# Patient Record
Sex: FEMALE | Race: BLACK OR AFRICAN AMERICAN | NOT HISPANIC OR LATINO
[De-identification: names, ages, dates, MRNs, and addresses within clinical notes are randomized per-mention and may not be internally consistent; named-entity substitution may affect disease eponyms.]

---

## 2017-02-08 ENCOUNTER — OTHER (OUTPATIENT)
Age: 47
End: 2017-02-08

## 2017-02-09 ENCOUNTER — APPOINTMENT (OUTPATIENT)
Dept: ORTHOPEDIC SURGERY | Facility: CLINIC | Age: 47
End: 2017-02-09

## 2017-02-10 ENCOUNTER — OTHER (OUTPATIENT)
Age: 47
End: 2017-02-10

## 2017-02-13 ENCOUNTER — OTHER (OUTPATIENT)
Age: 47
End: 2017-02-13

## 2017-02-14 ENCOUNTER — APPOINTMENT (OUTPATIENT)
Dept: ORTHOPEDIC SURGERY | Facility: CLINIC | Age: 47
End: 2017-02-14

## 2017-02-14 VITALS
HEIGHT: 66 IN | WEIGHT: 184 LBS | TEMPERATURE: 97.9 F | BODY MASS INDEX: 29.57 KG/M2 | DIASTOLIC BLOOD PRESSURE: 82 MMHG | HEART RATE: 77 BPM | SYSTOLIC BLOOD PRESSURE: 134 MMHG

## 2017-02-17 ENCOUNTER — OTHER (OUTPATIENT)
Age: 47
End: 2017-02-17

## 2017-03-03 ENCOUNTER — RESULT REVIEW (OUTPATIENT)
Age: 47
End: 2017-03-03

## 2017-03-08 ENCOUNTER — OTHER (OUTPATIENT)
Age: 47
End: 2017-03-08

## 2017-03-23 ENCOUNTER — OTHER (OUTPATIENT)
Age: 47
End: 2017-03-23

## 2017-04-13 ENCOUNTER — APPOINTMENT (OUTPATIENT)
Dept: ORTHOPEDIC SURGERY | Facility: CLINIC | Age: 47
End: 2017-04-13

## 2017-04-13 VITALS
HEART RATE: 79 BPM | HEIGHT: 66 IN | DIASTOLIC BLOOD PRESSURE: 93 MMHG | BODY MASS INDEX: 29.57 KG/M2 | SYSTOLIC BLOOD PRESSURE: 143 MMHG | WEIGHT: 184 LBS

## 2017-06-23 ENCOUNTER — OTHER (OUTPATIENT)
Age: 47
End: 2017-06-23

## 2018-06-21 ENCOUNTER — OTHER (OUTPATIENT)
Age: 48
End: 2018-06-21

## 2018-06-26 ENCOUNTER — APPOINTMENT (OUTPATIENT)
Dept: ORTHOPEDIC SURGERY | Facility: CLINIC | Age: 48
End: 2018-06-26
Payer: COMMERCIAL

## 2018-06-26 ENCOUNTER — OTHER (OUTPATIENT)
Age: 48
End: 2018-06-26

## 2018-06-26 VITALS
HEART RATE: 71 BPM | WEIGHT: 220 LBS | SYSTOLIC BLOOD PRESSURE: 112 MMHG | DIASTOLIC BLOOD PRESSURE: 74 MMHG | HEIGHT: 66 IN | BODY MASS INDEX: 35.36 KG/M2

## 2018-06-26 PROCEDURE — 73521 X-RAY EXAM HIPS BI 2 VIEWS: CPT | Mod: RT

## 2018-06-26 PROCEDURE — 99213 OFFICE O/P EST LOW 20 MIN: CPT

## 2018-06-26 RX ORDER — CLONAZEPAM 0.25 MG/1
0.25 TABLET, ORALLY DISINTEGRATING ORAL
Qty: 60 | Refills: 0 | Status: ACTIVE | COMMUNITY
Start: 2017-04-06

## 2018-06-26 RX ORDER — ESCITALOPRAM OXALATE 10 MG/1
10 TABLET, FILM COATED ORAL
Refills: 0 | Status: ACTIVE | COMMUNITY

## 2018-06-26 RX ORDER — ENALAPRIL MALEATE 10 MG/1
10 TABLET ORAL
Refills: 0 | Status: ACTIVE | COMMUNITY

## 2018-06-26 RX ORDER — DIVALPROEX SODIUM 500 1/1
500 TABLET, EXTENDED RELEASE ORAL
Qty: 90 | Refills: 0 | Status: ACTIVE | COMMUNITY
Start: 2017-02-22

## 2018-06-26 RX ORDER — LEVETIRACETAM 500 MG/1
500 TABLET, FILM COATED ORAL
Qty: 60 | Refills: 0 | Status: ACTIVE | COMMUNITY
Start: 2017-02-14

## 2018-06-26 RX ORDER — HYALURONATE SODIUM 20 MG/2 ML
20 SYRINGE (ML) INTRAARTICULAR
Qty: 12 | Refills: 0 | Status: DISCONTINUED | OUTPATIENT
Start: 2017-02-14 | End: 2018-06-26

## 2019-04-26 ENCOUNTER — APPOINTMENT (OUTPATIENT)
Dept: ELECTROPHYSIOLOGY | Facility: CLINIC | Age: 49
End: 2019-04-26

## 2019-07-10 ENCOUNTER — RESULT REVIEW (OUTPATIENT)
Age: 49
End: 2019-07-10

## 2020-10-07 ENCOUNTER — RESULT REVIEW (OUTPATIENT)
Age: 50
End: 2020-10-07

## 2020-11-10 ENCOUNTER — APPOINTMENT (OUTPATIENT)
Dept: ORTHOPEDIC SURGERY | Facility: CLINIC | Age: 50
End: 2020-11-10
Payer: MEDICARE

## 2020-11-10 VITALS
WEIGHT: 220 LBS | SYSTOLIC BLOOD PRESSURE: 132 MMHG | DIASTOLIC BLOOD PRESSURE: 91 MMHG | HEART RATE: 77 BPM | BODY MASS INDEX: 35.36 KG/M2 | HEIGHT: 66 IN

## 2020-11-10 DIAGNOSIS — M17.12 UNILATERAL PRIMARY OSTEOARTHRITIS, LEFT KNEE: ICD-10-CM

## 2020-11-10 PROCEDURE — 99214 OFFICE O/P EST MOD 30 MIN: CPT | Mod: 25

## 2020-11-10 PROCEDURE — 73521 X-RAY EXAM HIPS BI 2 VIEWS: CPT

## 2020-11-10 PROCEDURE — 20611 DRAIN/INJ JOINT/BURSA W/US: CPT | Mod: 50

## 2020-11-10 PROCEDURE — 73564 X-RAY EXAM KNEE 4 OR MORE: CPT | Mod: 50

## 2020-11-10 PROCEDURE — 99072 ADDL SUPL MATRL&STAF TM PHE: CPT

## 2020-11-10 NOTE — ADDENDUM
[FreeTextEntry1] : This note was authored by Yeyo Omer working as a medical scribe for Dr. Erick Valdivia. The note was reviewed, edited, and revised by Dr. Erick Valdivia whom is in agreement with the exam findings, imaging findings, and treatment plan. 11/10/2020.

## 2020-11-10 NOTE — PHYSICAL EXAM
[de-identified] : The patient appears well nourished  and in no apparent distress.  The patient is alert and oriented to person, place, and time.   Affect and mood appear normal. The head is normocephalic and atraumatic.  The eyes reveal normal sclera and extra ocular muscles are intact. The tongue is midline with no apparent lesions.  Skin shows normal turgor with no evidence of eczema or psoriasis.  No respiratory distress noted.  Sensation grossly intact.\par   [de-identified] : Exam of the left hip shows hip external rotation of 50 degrees, internal rotation of 25 degrees, hip flexion of 90 degrees.\par Exam of the left knee shows pain with knee ROM, patellofemoral crepitus, 0-115 degrees of flexion. \par Exam of the right knee shows pain with knee ROM, patellofemoral crepitus, 0-115 degrees of flexion. 5/5 motor strength bilaterally distally. Sensation intact distally.  [de-identified] : Xray- 4 views of the bilateral knees shows severe patellofemoral arthritis of the right knee, mild patellofemoral compartment arthritis of the left knee, and moderate medial compartment arthritis of both knees.\par \par X-ray of the pelvis and 2 views both hips shows hardware in the right hip which remains stable. The joint space remains overall fairly well preserved. Left hip replacement components appear to remain well fixed. \par

## 2020-11-10 NOTE — DISCUSSION/SUMMARY
[de-identified] : The patient is a 50 year old female with severe patellofemoral arthritis of the right knee, mild patellofemoral compartment arthritis of the left knee, and moderate medial compartment arthritis of both knees.  She also has some symptoms of trochanteric bursitis on the left side and lumbar radiculopathy.  Conservative options were discussed. She was given a cortisone injection in both knees today under ultrasound-guidance. She was also given a prescription for physical therapy.

## 2020-11-10 NOTE — PROCEDURE
[de-identified] : Using sterile technique, 2cc of depomedrol 40mg/ml, 4cc of 1% plain lidocaine, and 2 cc 0.25% marcaine was drawn up into a sterile syringe. The right knee joint space was identified using ultrasound. The right knee was then sterilely prepped with chlorhexidine. Ethyl chloride spray was used to anesthetize the skin and subQ tissue. The depomedrol/lidocaine/marcaine mixture was then injected into the knee joint in the superolateral position under ultrasound guidance and the needle position in the joint space was confirmed. The patient tolerated the procedure well without difficulty. The patient was given instructions on the use of ice and anti-inflammatories post injection site soreness. \par \par Using sterile technique, 2cc of depomedrol 40mg/ml, 4cc of 1% plain lidocaine, and 2 cc 0.25% marcaine was drawn up into a sterile syringe. The left knee joint space was identified using ultrasound. The left knee was then sterilely prepped with chlorhexidine. Ethyl chloride spray was used to anesthetize the skin and subQ tissue. The depomedrol/lidocaine/marcaine mixture was then injected into the knee joint in the superolateral position under ultrasound guidance and the needle position in the joint was confirmed. The patient tolerated the procedure well without difficulty. The patient was given instructions on the use of ice and anti-inflammatories post injection site soreness. \par

## 2020-11-10 NOTE — REASON FOR VISIT
[Follow-Up Visit] : a follow-up visit for [Other: ____] : [unfilled] [FreeTextEntry2] : S/P Conversion Left THR Removal of hardware left hip DOS:09/28/16.

## 2020-11-10 NOTE — HISTORY OF PRESENT ILLNESS
[de-identified] : Patient is a 50-year-old female status post left total hip replacement 9/28/2016 presenting for follow-up evaluation of left hip pain as well as bilateral knee pain.  Patient has history of seizures and short-term memory loss so is not a very good historian.  She states that back in January she took a fall on the steps landing on her back and her side which she believes precipitated all this pain.  Her hip pain is generalized laterally and posteriorly, but she does admit to intermittent groin pain at times.  She denies any incisional issues.  Patient denies any fevers or chills.  As for her knees she localizes the pain medially and anteriorly.  She states that overall her left knee is worse than the right.  Patient states she has had injections in the past but is not sure what type of injections and believes last one was in 2018.  In addition to this she has tried some therapy and home exercise without significant relief.  Patient takes anti-inflammatories and Tylenol as needed for pain with little improvement.

## 2020-11-17 RX ORDER — TRAMADOL HYDROCHLORIDE 50 MG/1
50 TABLET, COATED ORAL
Qty: 21 | Refills: 0 | Status: ACTIVE | COMMUNITY
Start: 2020-11-17 | End: 1900-01-01

## 2020-12-14 ENCOUNTER — APPOINTMENT (OUTPATIENT)
Dept: OBGYN | Facility: CLINIC | Age: 50
End: 2020-12-14
Payer: MEDICARE

## 2020-12-14 VITALS
BODY MASS INDEX: 37.12 KG/M2 | DIASTOLIC BLOOD PRESSURE: 95 MMHG | SYSTOLIC BLOOD PRESSURE: 151 MMHG | WEIGHT: 231 LBS | HEIGHT: 66 IN

## 2020-12-14 DIAGNOSIS — R87.810 CERVICAL HIGH RISK HUMAN PAPILLOMAVIRUS (HPV) DNA TEST POSITIVE: ICD-10-CM

## 2020-12-14 DIAGNOSIS — Z87.898 PERSONAL HISTORY OF OTHER SPECIFIED CONDITIONS: ICD-10-CM

## 2020-12-14 PROCEDURE — 99202 OFFICE O/P NEW SF 15 MIN: CPT

## 2020-12-14 PROCEDURE — 99072 ADDL SUPL MATRL&STAF TM PHE: CPT

## 2020-12-14 NOTE — HISTORY OF PRESENT ILLNESS
[Patient reported mammogram was normal] : Patient reported mammogram was normal [FreeTextEntry1] : 49 yo P 1021 LMP 02/2020  presents for consultation for colposcopy. 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 She reports was told needed colposcopy however was referred to office that was too far.\par She has no complaints. \par She denies any prior hx of abnormal pap. [TextBox_4] : Hx of Trichomonas  [Mammogramdate] : 03/2020 [PapSmeardate] : 08/2020

## 2020-12-14 NOTE — PHYSICAL EXAM
[Appropriately responsive] : appropriately responsive [Alert] : alert [No Acute Distress] : no acute distress [Soft] : soft [Non-tender] : non-tender [Non-distended] : non-distended [No HSM] : No HSM [No Lesions] : no lesions [No Mass] : no mass [Oriented x3] : oriented x3

## 2020-12-29 ENCOUNTER — APPOINTMENT (OUTPATIENT)
Dept: OBGYN | Facility: CLINIC | Age: 50
End: 2020-12-29

## 2021-01-28 ENCOUNTER — APPOINTMENT (OUTPATIENT)
Dept: OBGYN | Facility: CLINIC | Age: 51
End: 2021-01-28

## 2021-02-10 ENCOUNTER — RESULT REVIEW (OUTPATIENT)
Age: 51
End: 2021-02-10

## 2021-03-04 ENCOUNTER — APPOINTMENT (OUTPATIENT)
Dept: OBGYN | Facility: CLINIC | Age: 51
End: 2021-03-04

## 2021-04-07 ENCOUNTER — RESULT REVIEW (OUTPATIENT)
Age: 51
End: 2021-04-07

## 2021-07-20 ENCOUNTER — APPOINTMENT (OUTPATIENT)
Dept: UROLOGY | Facility: CLINIC | Age: 51
End: 2021-07-20

## 2021-11-09 ENCOUNTER — RESULT REVIEW (OUTPATIENT)
Age: 51
End: 2021-11-09

## 2021-12-21 ENCOUNTER — APPOINTMENT (OUTPATIENT)
Dept: UROLOGY | Facility: CLINIC | Age: 51
End: 2021-12-21

## 2022-06-08 ENCOUNTER — APPOINTMENT (OUTPATIENT)
Dept: ORTHOPEDIC SURGERY | Facility: CLINIC | Age: 52
End: 2022-06-08
Payer: MEDICARE

## 2022-06-08 VITALS — WEIGHT: 223 LBS | BODY MASS INDEX: 35.84 KG/M2 | HEIGHT: 66 IN

## 2022-06-08 DIAGNOSIS — M70.62 TROCHANTERIC BURSITIS, LEFT HIP: ICD-10-CM

## 2022-06-08 PROCEDURE — 99213 OFFICE O/P EST LOW 20 MIN: CPT

## 2022-06-08 PROCEDURE — 73521 X-RAY EXAM HIPS BI 2 VIEWS: CPT

## 2022-06-08 RX ORDER — ACETAMINOPHEN AND CODEINE 300; 30 MG/1; MG/1
300-30 TABLET ORAL
Qty: 30 | Refills: 0 | Status: ACTIVE | COMMUNITY
Start: 2022-06-08 | End: 1900-01-01
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
## 2022-06-08 NOTE — PHYSICAL EXAM
[Left] : left hip [5___] : adduction 5[unfilled]/5 [] : no palpable masses [FreeTextEntry9] : Pain from groin to the knee that shoots up to the back and neck.  [TWNoteComboBox7] : flexion 100 degrees [de-identified] : abduction 30 degrees [de-identified] : external rotation 40 degrees [TWNoteComboBox6] : internal rotation 30 degrees

## 2022-07-11 ENCOUNTER — FORM ENCOUNTER (OUTPATIENT)
Age: 52
End: 2022-07-11

## 2022-07-13 ENCOUNTER — APPOINTMENT (OUTPATIENT)
Dept: ORTHOPEDIC SURGERY | Facility: CLINIC | Age: 52
End: 2022-07-13

## 2022-07-14 ENCOUNTER — FORM ENCOUNTER (OUTPATIENT)
Age: 52
End: 2022-07-14

## 2022-07-21 ENCOUNTER — APPOINTMENT (OUTPATIENT)
Dept: ORTHOPEDIC SURGERY | Facility: CLINIC | Age: 52
End: 2022-07-21

## 2022-07-31 ENCOUNTER — FORM ENCOUNTER (OUTPATIENT)
Age: 52
End: 2022-07-31

## 2022-12-19 ENCOUNTER — APPOINTMENT (OUTPATIENT)
Dept: ORTHOPEDIC SURGERY | Facility: CLINIC | Age: 52
End: 2022-12-19

## 2022-12-21 ENCOUNTER — APPOINTMENT (OUTPATIENT)
Dept: ORTHOPEDIC SURGERY | Facility: CLINIC | Age: 52
End: 2022-12-21

## 2023-06-21 ENCOUNTER — APPOINTMENT (OUTPATIENT)
Dept: ORTHOPEDIC SURGERY | Facility: CLINIC | Age: 53
End: 2023-06-21
Payer: MEDICARE

## 2023-06-21 DIAGNOSIS — Z96.642 PRESENCE OF LEFT ARTIFICIAL HIP JOINT: ICD-10-CM

## 2023-06-21 DIAGNOSIS — M16.11 UNILATERAL PRIMARY OSTEOARTHRITIS, RIGHT HIP: ICD-10-CM

## 2023-06-21 PROCEDURE — 99214 OFFICE O/P EST MOD 30 MIN: CPT

## 2023-06-21 NOTE — HISTORY OF PRESENT ILLNESS
[de-identified] : Follow up Patient is here today for bilateral hips. She had a bilateral hip pinning as a child and a left MICHELLE 2016.\par She notes pain into the groin b/l.  She states she feels she cant walk. She would like MRI hips.

## 2023-06-21 NOTE — PHYSICAL EXAM
[Bilateral] : hip bilaterally [5___] : adduction 5[unfilled]/5 [] : no palpable masses [TWNoteComboBox7] : flexion 100 degrees [de-identified] : abduction 30 degrees [de-identified] : external rotation 40 degrees [TWNoteComboBox6] : internal rotation 30 degrees

## 2023-06-21 NOTE — DISCUSSION/SUMMARY
[de-identified] : She still notes pain and cant walk well. She wants MRI's hips.\par \par Patient reports she had hip dysplasia as a child\par \par Patient states that the hip pain is dibilitating interferes with daily activity with no relief from any previous treatment. At this time it is recommended for Bilateral Metal subtraction Hip MRI's in order to further evaluate any soft tissue injury or abnormality. Follow up after MRI \par \par Entered by Anjelica Hurt acting as scribe.\par Dr. Schultz Attestation\par The documentation recorded by the scribe, in my presence, accurately reflects the service I, Dr. Schultz, personally performed, and the decisions made by me with my edits as appropriate.

## 2023-06-27 ENCOUNTER — FORM ENCOUNTER (OUTPATIENT)
Age: 53
End: 2023-06-27

## 2023-06-28 ENCOUNTER — FORM ENCOUNTER (OUTPATIENT)
Age: 53
End: 2023-06-28

## 2023-06-29 ENCOUNTER — FORM ENCOUNTER (OUTPATIENT)
Age: 53
End: 2023-06-29

## 2023-07-09 ENCOUNTER — FORM ENCOUNTER (OUTPATIENT)
Age: 53
End: 2023-07-09

## 2023-07-24 ENCOUNTER — APPOINTMENT (OUTPATIENT)
Dept: ORTHOPEDIC SURGERY | Facility: CLINIC | Age: 53
End: 2023-07-24
Payer: MEDICARE

## 2023-07-24 VITALS — BODY MASS INDEX: 35.84 KG/M2 | HEIGHT: 66 IN | WEIGHT: 223 LBS

## 2023-07-24 DIAGNOSIS — S83.241D OTHER TEAR OF MEDIAL MENISCUS, CURRENT INJURY, RIGHT KNEE, SUBSEQUENT ENCOUNTER: ICD-10-CM

## 2023-07-24 DIAGNOSIS — M17.12 UNILATERAL PRIMARY OSTEOARTHRITIS, LEFT KNEE: ICD-10-CM

## 2023-07-24 DIAGNOSIS — M25.551 PAIN IN RIGHT HIP: ICD-10-CM

## 2023-07-24 DIAGNOSIS — S73.191A OTHER SPRAIN OF RIGHT HIP, INITIAL ENCOUNTER: ICD-10-CM

## 2023-07-24 DIAGNOSIS — M25.562 PAIN IN RIGHT KNEE: ICD-10-CM

## 2023-07-24 DIAGNOSIS — M25.561 PAIN IN RIGHT KNEE: ICD-10-CM

## 2023-07-24 DIAGNOSIS — M25.552 PAIN IN RIGHT HIP: ICD-10-CM

## 2023-07-24 DIAGNOSIS — M17.11 UNILATERAL PRIMARY OSTEOARTHRITIS, RIGHT KNEE: ICD-10-CM

## 2023-07-24 PROCEDURE — 99214 OFFICE O/P EST MOD 30 MIN: CPT

## 2023-07-24 PROCEDURE — 73562 X-RAY EXAM OF KNEE 3: CPT | Mod: 50

## 2023-07-24 RX ORDER — HYDROCODONE BITARTRATE AND ACETAMINOPHEN 5; 325 MG/1; MG/1
5-325 TABLET ORAL
Qty: 30 | Refills: 0 | Status: ACTIVE | COMMUNITY
Start: 2023-07-24 | End: 1900-01-01

## 2023-07-24 NOTE — DISCUSSION/SUMMARY
[de-identified] : Extensive discussion of the options was had with the patient. This discussion included both surgical and nonsurgical options. Options including but not limited to cortisone injection, physical therapy, oral anti-inflammatories, MRI, arthroplasty and arthroscopy were discussed with the patient. We also discussed the option of observation, allowing the patient to continue rest, ice, NSAIDs and following up if the condition does not improve. Time was taken to go over any questions the patient had in regards to any of the treatment plans described. \par \par Patient was given Rx for MRI of the RIGHT HIP  to further evaluate for any ligamentous, muscle and cartilaginous injury that is suspected due to physical examination and patients description of pain, clicking, and feelings of instability and/or weakness. Patient was instructed to f/u after imaging for review. \par \par Entered by Chang Aldridge acting as scribe.\par Dr. Schultz Attestation\par The documentation recorded by the scribe, in my presence, accurately reflects the service I, Dr. Schultz, personally performed, and the decisions made by me with my edits as appropriate.

## 2023-07-24 NOTE — PHYSICAL EXAM
[5___] : adduction 5[unfilled]/5 [NL (0)] : extension 0 degrees [Bilateral] : knee bilaterally [de-identified] : abduction 30 degrees [de-identified] : external rotation 40 degrees [TWNoteComboBox6] : internal rotation 30 degrees [] : no atrophy [FreeTextEntry9] : ap/lat/skiers show Right knee moderate medial joint space narrowing and pF spurring\par Left knee mild medial joint space narrowing and minimal  spurring.  [TWNoteComboBox7] : flexion 100 degrees

## 2023-07-24 NOTE — HISTORY OF PRESENT ILLNESS
[de-identified] : follow up patient is here today for her BL Hip/knee. MRI was denied previously due to no NON Operative treatment. Pt was in hospital on 7/2/23. Patient denies physical therapy but has been doing HEP and also exercise in pool at home. She still notes pain with any movement.  She states since last PE she has noted more pain from the exam.  She went to [facility]

## 2023-08-11 ENCOUNTER — APPOINTMENT (OUTPATIENT)
Dept: ORTHOPEDIC SURGERY | Facility: CLINIC | Age: 53
End: 2023-08-11
Payer: MEDICARE

## 2023-08-11 VITALS — HEIGHT: 66 IN | WEIGHT: 223 LBS | BODY MASS INDEX: 35.84 KG/M2

## 2023-08-11 DIAGNOSIS — M43.16 SPONDYLOLISTHESIS, LUMBAR REGION: ICD-10-CM

## 2023-08-11 DIAGNOSIS — G89.29 CERVICALGIA: ICD-10-CM

## 2023-08-11 DIAGNOSIS — M41.50 OTHER SECONDARY SCOLIOSIS, SITE UNSPECIFIED: ICD-10-CM

## 2023-08-11 DIAGNOSIS — M54.2 CERVICALGIA: ICD-10-CM

## 2023-08-11 PROCEDURE — 99214 OFFICE O/P EST MOD 30 MIN: CPT

## 2023-08-11 PROCEDURE — 72050 X-RAY EXAM NECK SPINE 4/5VWS: CPT

## 2023-08-11 PROCEDURE — 99204 OFFICE O/P NEW MOD 45 MIN: CPT

## 2023-08-25 NOTE — DATA REVIEWED
[FreeTextEntry1] : On my interpretation of these images  [facility] xrays august 18 2022  left total hip. rt hip pinning. scoliotic deformity of 28 degrees T11-L5. L3/4 grade 1 listhesis and facet arthropathy L2-S1  in office x-rays cervical spine demonstrates mild multi-level DDD. no evidence for fracture. flex ex is very limited secondary to pain.   lumbar MRI [facility] 9/20/22 which shows L3-S1 facet degeneration, L3-4 mild to mod lateral recess stenosis, L4-5 mild lateral recess stenosis.
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
## 2023-08-25 NOTE — PHYSICAL EXAM
[Biceps 2+] : biceps 2+ [Triceps 2+] : triceps 2+ [Brachioradialis 2+] : brachioradialis 2+ [5___] : right extensor hallicus longus 5[unfilled]/5 [de-identified] : Constitutional: - General Appearance: Unremarkable Body Habitus Well Developed Well Nourished Body Habitus No Deformities Well Groomed Ability To communicate: Normal Neurologic: Global sensation is intact to upper and lower extremities. See examination of Neck and/or Spine for exceptions. Orientation to Time, Place and Person is: Normal Mood And Affect is Normal Skin: - Head/Face, Right Upper/Lower Extremity, Left Upper/Lower Extremity: Normal See Examination of Neck and/or Spine for exceptions Cardiovascular: Peripheral Cardiovascular System is Normal Palpation of Lymph Nodes: Normal Palpation of lymph nodes in: Axilla, Cervical, Inguinal Abnormal Palpation of lymph nodes in: None  [FreeTextEntry8] : intermittent radicular pain right arm  [] : clonus not sustained at ankle
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
## 2023-09-06 ENCOUNTER — APPOINTMENT (OUTPATIENT)
Dept: ORTHOPEDIC SURGERY | Facility: CLINIC | Age: 53
End: 2023-09-06

## 2023-09-19 ENCOUNTER — APPOINTMENT (OUTPATIENT)
Dept: PAIN MANAGEMENT | Facility: CLINIC | Age: 53
End: 2023-09-19